# Patient Record
Sex: FEMALE | Race: WHITE | NOT HISPANIC OR LATINO | Employment: UNEMPLOYED | URBAN - NONMETROPOLITAN AREA
[De-identification: names, ages, dates, MRNs, and addresses within clinical notes are randomized per-mention and may not be internally consistent; named-entity substitution may affect disease eponyms.]

---

## 2017-02-03 ENCOUNTER — OFFICE VISIT (OUTPATIENT)
Dept: ORTHOPEDIC SURGERY | Facility: CLINIC | Age: 52
End: 2017-02-03

## 2017-02-03 VITALS — BODY MASS INDEX: 34.55 KG/M2 | HEIGHT: 66 IN | WEIGHT: 215 LBS

## 2017-02-03 DIAGNOSIS — M51.36 DDD (DEGENERATIVE DISC DISEASE), LUMBAR: Primary | ICD-10-CM

## 2017-02-03 DIAGNOSIS — M54.6 CHRONIC THORACIC BACK PAIN, UNSPECIFIED BACK PAIN LATERALITY: ICD-10-CM

## 2017-02-03 DIAGNOSIS — G89.29 CHRONIC THORACIC BACK PAIN, UNSPECIFIED BACK PAIN LATERALITY: ICD-10-CM

## 2017-02-03 PROCEDURE — 99203 OFFICE O/P NEW LOW 30 MIN: CPT | Performed by: ORTHOPAEDIC SURGERY

## 2017-02-03 RX ORDER — NAPROXEN 500 MG/1
TABLET ORAL
COMMUNITY
Start: 2017-01-09

## 2017-02-03 RX ORDER — ERGOCALCIFEROL 1.25 MG/1
CAPSULE ORAL
COMMUNITY
Start: 2017-01-09

## 2017-02-03 RX ORDER — BUPROPION HYDROCHLORIDE 150 MG/1
TABLET, EXTENDED RELEASE ORAL
COMMUNITY
Start: 2016-12-30

## 2017-02-03 RX ORDER — SIMVASTATIN 20 MG
TABLET ORAL
COMMUNITY
Start: 2017-01-09

## 2017-02-03 RX ORDER — CARISOPRODOL 350 MG/1
TABLET ORAL
COMMUNITY
Start: 2017-01-09

## 2017-02-03 RX ORDER — HYDROCODONE BITARTRATE AND ACETAMINOPHEN 7.5; 325 MG/1; MG/1
TABLET ORAL
COMMUNITY
Start: 2016-12-06

## 2017-02-03 NOTE — PROGRESS NOTES
Clarice Joy is a 51 y.o. female   Primary provider:  No Known Provider       Chief Complaint   Patient presents with   • Thoracic Spine - Establish Care     Xray at [facility] imaging       HISTORY OF PRESENT ILLNESS:    Back Pain   This is a chronic problem. The pain is present in the thoracic spine. The pain is at a severity of 5/10. The pain is mild. The pain is the same all the time. Exacerbated by: lifting, pushing or pulling. Stiffness is present all day. Associated symptoms include numbness and tingling. Pertinent negatives include no abdominal pain, chest pain, dysuria, fever, headaches or weakness.      described worsening back pain.  Pain does not radiate.  The pain is aching and worse with activity.    CONCURRENT MEDICAL HISTORY:    Past Medical History   Diagnosis Date   • Arthritis    • Asthma        No Known Allergies      Current Outpatient Prescriptions:   •  buPROPion SR (WELLBUTRIN SR) 150 MG 12 hr tablet, , Disp: , Rfl:   •  carisoprodol (SOMA) 350 MG tablet, , Disp: , Rfl:   •  HYDROcodone-acetaminophen (NORCO) 7.5-325 MG per tablet, , Disp: , Rfl:   •  naproxen (NAPROSYN) 500 MG tablet, , Disp: , Rfl:   •  simvastatin (ZOCOR) 20 MG tablet, , Disp: , Rfl:   •  vitamin D (ERGOCALCIFEROL) 98514 UNITS capsule capsule, , Disp: , Rfl:     Past Surgical History   Procedure Laterality Date   • Knee surgery Left 2013     donor acl dr Damon       Family History   Problem Relation Age of Onset   • Asthma Mother    • COPD Mother    • Hypertension Father         Social History     Social History   • Marital status: Single     Spouse name: N/A   • Number of children: N/A   • Years of education: N/A     Occupational History   • Not on file.     Social History Main Topics   • Smoking status: Current Every Day Smoker     Packs/day: 1.00     Types: Cigarettes   • Smokeless tobacco: Not on file   • Alcohol use No   • Drug use: No   • Sexual activity: Not on file     Other Topics Concern   • Not on file  "    Social History Narrative   • No narrative on file        Review of Systems   Constitutional: Positive for fatigue and unexpected weight change. Negative for appetite change, chills, diaphoresis and fever.   HENT: Negative.  Negative for congestion, dental problem, facial swelling, hearing loss, nosebleeds, postnasal drip, trouble swallowing and voice change.    Eyes: Positive for photophobia and visual disturbance. Negative for pain, discharge, redness and itching.   Respiratory: Positive for shortness of breath. Negative for cough, choking, chest tightness, wheezing and stridor.    Cardiovascular: Negative.  Negative for chest pain, palpitations and leg swelling.   Gastrointestinal: Negative.  Negative for abdominal pain, blood in stool, constipation, diarrhea and nausea.   Endocrine: Negative.  Negative for cold intolerance and heat intolerance.   Genitourinary: Negative.  Negative for dysuria, frequency, hematuria and urgency.   Musculoskeletal: Positive for arthralgias, back pain and neck pain. Negative for gait problem, joint swelling and neck stiffness.   Skin: Negative.  Negative for pallor and rash.   Allergic/Immunologic: Negative.    Neurological: Positive for tingling and numbness. Negative for syncope, facial asymmetry, speech difficulty, weakness and headaches.   Hematological: Negative.    Psychiatric/Behavioral: Positive for sleep disturbance. Negative for agitation, behavioral problems, confusion, decreased concentration, dysphoric mood and hallucinations. The patient is not nervous/anxious and is not hyperactive.    All other systems reviewed and are negative.      PHYSICAL EXAMINATION:       Visit Vitals   • Ht 66\" (167.6 cm)   • Wt 215 lb (97.5 kg)   • BMI 34.7 kg/m2       Physical Exam   Constitutional: She is oriented to person, place, and time. She appears well-developed and well-nourished. No distress.   Obese, disheveled   HENT:   Head: Normocephalic.   Right Ear: External ear normal. "   Left Ear: External ear normal.   Mouth/Throat: No oropharyngeal exudate.   Eyes: EOM are normal. Pupils are equal, round, and reactive to light. Right eye exhibits no discharge. Left eye exhibits no discharge. No scleral icterus.   Neck: Normal range of motion. No JVD present. No tracheal deviation present. No thyromegaly present.   Cardiovascular: Normal rate, regular rhythm, normal heart sounds and intact distal pulses.  Exam reveals no gallop and no friction rub.    No murmur heard.  Pulmonary/Chest: Effort normal and breath sounds normal. No respiratory distress. She has no wheezes. She has no rales. She exhibits no tenderness.   Abdominal: Soft. Bowel sounds are normal. She exhibits no distension and no mass. There is no tenderness. There is no guarding.   Musculoskeletal: Normal range of motion. She exhibits no edema or deformity.        Thoracic back: She exhibits normal range of motion, no tenderness, no bony tenderness, no swelling, no edema, no deformity, no laceration, no pain, no spasm and normal pulse.        Lumbar back: Normal. She exhibits normal range of motion, no tenderness, no bony tenderness, no swelling, no edema, no deformity, no laceration, no pain, no spasm and normal pulse.   Lymphadenopathy:     She has no cervical adenopathy.   Neurological: She is alert and oriented to person, place, and time. She has normal reflexes. She displays normal reflexes. No cranial nerve deficit. She exhibits normal muscle tone. Coordination normal.   Skin: Skin is warm and dry. No rash noted. She is not diaphoretic. No erythema.   Psychiatric: She has a normal mood and affect. Her behavior is normal. Thought content normal.       GAIT:     []  Normal  []  Antalgic    Assistive device: []  None  []  Walker     []  Crutches  []  Cane     []  Wheelchair  []  Stretcher    Right Ankle Exam     Muscle Strength   Dorsiflexion:  5/5  Plantar flexion:  5/5  Anterior tibial:  5/5  Posterior tibial:   5/5  Gastrocsoleus:  5/5  Peroneal muscle:  5/5      Left Ankle Exam     Muscle Strength   Dorsiflexion:  5/5   Plantar flexion:  5/5   Anterior tibial:  5/5   Posterior tibial:  5/5  Gastrocsoleus:  5/5  Peroneal muscle:  5/5      Right Hip Exam     Range of Motion   Internal Rotation: normal   External Rotation: normal     Muscle Strength   Abduction: 5/5   Flexion: 5/5       Left Hip Exam     Range of Motion   Internal Rotation: normal   External Rotation: normal     Muscle Strength   Abduction: 5/5   Flexion: 5/5       Back Exam     Tenderness   The patient is experiencing tenderness in the lumbar.    Range of Motion   Extension: 30   Flexion:  90 normal   Lateral Bend Right: 20   Lateral Bend Left: 20   Rotation Right: 30   Rotation Left: 30     Muscle Strength   Right Quadriceps:  5/5   Left Quadriceps:  5/5   Right Hamstrings:  5/5   Left Hamstrings:  5/5     Tests   Straight leg raise right: negative  Straight leg raise left: negative    Reflexes   Patellar: 2/4  Achilles: 2/4  Biceps: 2/4  Babinski's sign: normal     Other   Sensation: normal  Gait: normal   Erythema: no back redness  Scars: absent              Xray L spine 12/27/16 @ [facility]  Impression:  Mild degenerative change.         ASSESSMENT:    Diagnoses and all orders for this visit:    DDD (degenerative disc disease), lumbar  -     MRI Lumbar Spine Without Contrast; Future    Chronic thoracic back pain, unspecified back pain laterality  -     MRI Lumbar Spine Without Contrast; Future    Other orders  -     HYDROcodone-acetaminophen (NORCO) 7.5-325 MG per tablet;   -     vitamin D (ERGOCALCIFEROL) 73323 UNITS capsule capsule;   -     simvastatin (ZOCOR) 20 MG tablet;   -     naproxen (NAPROSYN) 500 MG tablet;   -     carisoprodol (SOMA) 350 MG tablet;   -     buPROPion SR (WELLBUTRIN SR) 150 MG 12 hr tablet;           PLAN    Recommend MRI of lumbar spine to assess for lumbar decompensation.    Yves Craig,  MD

## 2018-10-30 ENCOUNTER — TRANSCRIBE ORDERS (OUTPATIENT)
Dept: PHYSICAL THERAPY | Facility: HOSPITAL | Age: 53
End: 2018-10-30

## 2018-10-30 DIAGNOSIS — M79.7 FIBROMYALGIA: Primary | ICD-10-CM

## 2018-11-13 ENCOUNTER — APPOINTMENT (OUTPATIENT)
Dept: PHYSICAL THERAPY | Facility: HOSPITAL | Age: 53
End: 2018-11-13

## 2018-11-14 ENCOUNTER — HOSPITAL ENCOUNTER (OUTPATIENT)
Dept: PHYSICAL THERAPY | Facility: HOSPITAL | Age: 53
Setting detail: THERAPIES SERIES
Discharge: HOME | End: 2018-11-14

## 2018-11-14 DIAGNOSIS — M79.7 FIBROMYALGIA: Primary | ICD-10-CM

## 2018-11-14 PROCEDURE — 97162 PT EVAL MOD COMPLEX 30 MIN: CPT | Performed by: PHYSICAL THERAPIST

## 2018-11-14 NOTE — THERAPY EVALUATION
Outpatient Physical Therapy Ortho Initial Evaluation  [facility]     Patient Name: Clarice Joy  : 1965  MRN: [id number]  Today's Date: 2018      Visit Date: 2018  Visit   Return to MD: RUDY  Re-cert date: 18  Patient Active Problem List   Diagnosis   • Asthma   • Arthritis        Past Medical History:   Diagnosis Date   • Arthritis    • Asthma    • COPD (chronic obstructive pulmonary disease) (CMS/[facility])    • Elevated cholesterol    • Fibromyalgia         Past Surgical History:   Procedure Laterality Date   • KNEE ACL RECONSTRUCTION Left    • KNEE SURGERY Left     donor acl dr Damon       Visit Dx:     ICD-10-CM ICD-9-CM   1. Fibromyalgia M79.7 729.1     Medications (Admitted on 2018)     buPROPion SR (WELLBUTRIN SR) 150 MG 12 hr tablet     carisoprodol (SOMA) 350 MG tablet     HYDROcodone-acetaminophen (NORCO) 7.5-325 MG per tablet     naproxen (NAPROSYN) 500 MG tablet     simvastatin (ZOCOR) 20 MG tablet     vitamin D (ERGOCALCIFEROL) 30635 UNITS capsule capsule      Allergies: NKA      PT Ortho     Row Name 18 1000       Subjective Comments    Subjective Comments  54 yo female with chronic neck pain for the past 5 yrs, LBP for the past 8 months and has had ongoing right ankle pain s/p R ankle fx 1 yr ago. Diagnosed with fibromyalgia by Dr. Ahn. MD said she would benefit from aquatic therapy at this time.   -BS       Precautions and Contraindications    Precautions  fibromyalgia, h/o R ACL reconstruction 5 years ago  -BS       Subjective Pain    Able to rate subjective pain?  yes  -BS    Pre-Treatment Pain Level  7  -BS    Post-Treatment Pain Level  8  -BS    Subjective Pain Comment  neck and low back pain-sharp  -BS       General ROM    GENERAL ROM COMMENTS  B shoulder AROM WFL exept min limit ~130° B shoulder abduction; cervical spine: flex WNL ext 75% min limit rotation R 75% L 50% lateral flex B 50% retraction 50% mod limit; lumbar spine: WFL  (all planes) LE's: R hip flex 92° L hip flex 93° R knee 0-127?8 L knee 0-122° R ankle DF 4° PF 38° inv 32° whitney 15°    -BS       MMT (Manual Muscle Testing)    General MMT Comments  B C4-T1 myotome strength 5/5; LE myotomes: R LE 5/5 except 4+/5 R quads and R ankle inversion; L LE-5/5  -BS       Sensation    Light Touch  Partial deficits in the LLE;Partial deficits in the LUE L C8 (5th digit) numbness, L L2-3 paresthesia, L foot numb  -BS      User Key  (r) = Recorded By, (t) = Taken By, (c) = Cosigned By    Initials Name Provider Type    Edward Marquez, PT Physical Therapist                      Therapy Education  Given: Posture/body mechanics  Program: New  How Provided: Verbal  Provided to: Patient  Level of Understanding: Verbalized     PT OP Goals     Row Name 11/14/18 1015 11/14/18 1000       PT Short Term Goals    STG Date to Achieve  11/28/18  -BS  --    STG 1  Pt indep with HEP for aquatic therapy  -BS  --    STG 1 Progress  New  -BS  --    STG 2  Able to tolerate 25 minutes of continuous aquatic therapy with minimal fatigue noted  -BS  --    STG 2 Progress  New  -BS  --    STG 3  Reduce neck and low back pain by 25% performing ADL's  -BS  --    STG 3 Progress  New  -BS  --    STG 4  Improve cervical spine AROM to minimal limit 75%  -BS  --    STG 4 Progress  New  -BS  --       Long Term Goals    LTG Date to Achieve  12/12/18  -BS  --    LTG 1  Able to tolerate 35 minutes of continuous aquatic therapy with minimal fatigue noted  -BS  --    LTG 1 Progress  New  -BS  --    LTG 2  Reduce neck and low back pain by 50% performing ADL's  -BS  --    LTG 2 Progress  New  -BS  --    LTG 3  Improve cervical spine AROM to %  -BS  --    LTG 3 Progress  New  -BS  --    LTG 4  Reduce NDI score to 25 or less  -BS  --    LTG 4 Progress  New  -BS  --    LTG 5  Reduce Modified Oswestry score to 27 or less  -BS  --    LTG 5 Progress  New  -BS  --       Time Calculation    PT Goal Re-Cert Due Date  --  -BS  12/05/18   -BS      User Key  (r) = Recorded By, (t) = Taken By, (c) = Cosigned By    Initials Name Provider Type    BS Edward Ruggiero, PT Physical Therapist          PT Assessment/Plan     Row Name 11/14/18 1100          PT Assessment    Functional Limitations  Impaired gait;Performance in work activities;Performance in sport activities;Performance in self-care ADL;Performance in leisure activities;Limitation in home management  -BS     Impairments  Impaired flexibility;Sensation;Range of motion;Pain;Posture;Muscle strength  -BS     Assessment Comments  Chronic neck pain and severe LBP with h/o fibromyalgia. Pt would benefit from aquatic therapy to improved cervical spine mobility and assist with pain management of c-spine and l-spine.  -BS     Please refer to paper survey for additional self-reported information  Yes  -BS     Rehab Potential  Fair  -BS     Patient/caregiver participated in establishment of treatment plan and goals  Yes  -BS     Patient would benefit from skilled therapy intervention  Yes  -BS        PT Plan    PT Frequency  2x/week pool only  -BS     Predicted Duration of Therapy Intervention (Therapy Eval)  3-4 weeks  -BS     Planned CPT's?  PT EVAL MOD COMPLELITY: 37447;PT RE-EVAL: 07266;PT THER PROC EA 15 MIN: 21126;PT THER ACT EA 15 MIN: 03049;PT AQUATIC THERAPY EA 15 MIN: 30005;PT ELECTRICAL STIM UNATTEND: ;PT HOT/COLD PACK WC NONMCARE: 75192;PT NEUROMUSC RE-EDUCATION EA 15 MIN: 78008;PT THER SUPP EA 15 MIN  -BS     Physical Therapy Interventions (Optional Details)  aquatics exercise;balance training;gait training;home exercise program;lumbar stabilization;manual therapy techniques;modalities;neuromuscular re-education;patient/family education;postural re-education;ROM (Range of Motion);stretching;stair training;strengthening;transfer training  -BS     PT Plan Comments  f/u with aquatic therapy to address neck and low back pain. Emphasize trunk mobility.  -BS       User Key  (r) = Recorded By, (t)  = Taken By, (c) = Cosigned By    Initials Name Provider Type    Edward Marquez PT Physical Therapist            Exercises     Row Name 11/14/18 1000             Subjective Comments    Subjective Comments  54 yo female with chronic neck pain for the past 5 yrs, LBP for the past 8 months and has had ongoing right ankle pain s/p R ankle fx 1 yr ago. Diagnosed with fibromyalgia by Dr. Ahn. MD said she would benefit from aquatic therapy at this time.   -BS         Subjective Pain    Able to rate subjective pain?  yes  -BS      Pre-Treatment Pain Level  7  -BS      Post-Treatment Pain Level  8  -BS      Subjective Pain Comment  neck and low back pain-sharp  -BS        User Key  (r) = Recorded By, (t) = Taken By, (c) = Cosigned By    Initials Name Provider Type    Edward Marquez, PT Physical Therapist                        Outcome Measure Options: Hong Beebe, Neck Disability Index (NDI)  Modified Oswestry  Modified Oswestry Score/Comments: 32/50-66%  Neck Disability Index  Section 1 - Pain Intensity: The pain is moderate at the moment.  Section 2 - Personal Care: I need some help but manage most of my personal care.  Section 3 - Lifting: I can lift only very light weights.  Section 4 - Work: I can't do my usual work.  Section 5 - Headaches: I have moderate headaches that come frequently  Section 6 - Concentration: I have a lot of difficulty concentrating.  Section 7 - Sleeping: My sleep is mildly disturbed for up to 1-2 hours.  Section 8 - Driving: I can't drive as long as I want because of moderate neck pain.  Section 9 - Reading: I can't read as much as I want because of severe neck pain.  Section 10 - Recreation: I can hardly do recreational activities due to neck pain.  Neck Disability Index Score: 31      Time Calculation:     Therapy Suggested Charges     Code   Minutes Charges    None             Start Time: 1018  Stop Time: 1056  Time Calculation (min): 38 min  Total Timed Code Minutes- PT: 38  minute(s)     Therapy Charges for Today     Code Description Service Date Service Provider Modifiers Qty    58821144578 HC PT EVAL MOD COMPLEXITY 3 11/14/2018 Edward Ruggiero, PT GP 1          PT G-Sahil  Outcome Measure Options: Hong Beebe Neck Disability Index (NDI)  Modified Oswestry Score/Comments: 32/50-66%  Neck Disability Index Score: 31         Edward Ruggiero, PT  11/14/2018

## 2018-11-21 ENCOUNTER — HOSPITAL ENCOUNTER (OUTPATIENT)
Dept: PHYSICAL THERAPY | Facility: HOSPITAL | Age: 53
Setting detail: THERAPIES SERIES
Discharge: HOME | End: 2018-11-21

## 2018-11-21 DIAGNOSIS — M79.7 FIBROMYALGIA: Primary | ICD-10-CM

## 2018-11-21 PROCEDURE — 97110 THERAPEUTIC EXERCISES: CPT | Performed by: SPECIALIST/TECHNOLOGIST

## 2018-11-21 NOTE — THERAPY TREATMENT NOTE
"    Outpatient Physical Therapy Ortho Treatment Note  [facility]     Patient Name: Clarice Joy  : 1965  MRN: [id number]  Today's Date: 2018      Visit Date: 2018   Patients reports pain as:  5/10   Patient reports overall % improvement as:  0%    Patients attendance has been:  2/2   Insurance visits available  pre cert at 20th visits   Recert Date is:  18   Next MD visit is:  TBD       Visit Dx:    ICD-10-CM ICD-9-CM   1. Fibromyalgia M79.7 729.1       Patient Active Problem List   Diagnosis   • Asthma   • Arthritis        Past Medical History:   Diagnosis Date   • Arthritis    • Asthma    • COPD (chronic obstructive pulmonary disease) (CMS/[facility])    • Elevated cholesterol    • Fibromyalgia         Past Surgical History:   Procedure Laterality Date   • KNEE ACL RECONSTRUCTION Left    • KNEE SURGERY Left     donor acl dr Damon                       PT Assessment/Plan     Row Name 18 1000          PT Assessment    Assessment Comments  juliana rx. able to complete all. reports > pain w/ rx, but able to complete all without objective difficulty.  reports neck pain w/ HA  (Pended)   -ML        PT Plan    PT Frequency  2x/week  (Pended)   -ML     PT Plan Comments  check response. advance slowly.  build volume gradual.    (Pended)   -ML       User Key  (r) = Recorded By, (t) = Taken By, (c) = Cosigned By    Initials Name Provider Type    Jacques Musa, ATC               Exercises     Row Name 18 1000             Subjective Pain    Able to rate subjective pain?  yes  (Pended)   -ML      Pre-Treatment Pain Level  5  (Pended)   -ML      Post-Treatment Pain Level  6  (Pended)   -ML      Subjective Pain Comment  \"all over\"  (Pended)   -ML         Aquatics    Aquatics performed?  Yes  (Pended)   -ML      25408 - Aquatic Therapy Minutes  42  (Pended)   -ML         Exercise 1    Exercise Name 1  AQ: walk F/R/L  (Pended)   -ML      Time 1  5 min ea.  " (Pended)   -ML         Exercise 2    Exercise Name 2  AQ: SLR's 3 way -both  (Pended)   -ML      Reps 2  10  (Pended)   -ML         Exercise 3    Exercise Name 3  AQ: mini squats  (Pended)   -ML      Sets 3  2  (Pended)   -ML      Reps 3  10  (Pended)   -ML         Exercise 4    Exercise Name 4  AQ: Cookie push pull w/ MS  (Pended)   -ML      Sets 4  2  (Pended)   -ML      Reps 4  10  (Pended)   -ML         Exercise 5    Exercise Name 5  AQ: surface wipes  (Pended)   -ML      Reps 5  10  (Pended)   -ML         Exercise 6    Exercise Name 6  AQ: DW bike  (Pended)   -ML      Time 6  5 min  (Pended)   -ML         Exercise 7    Exercise Name 7  AQ: DW Hang  (Pended)   -ML      Time 7  5 min   (Pended)   -ML        User Key  (r) = Recorded By, (t) = Taken By, (c) = Cosigned By    Initials Name Provider Type    Jacques Musa, ATC                          PT OP Goals     Row Name 11/21/18 0900          PT Short Term Goals    STG Date to Achieve  11/28/18  (Pended)   -ML     STG 1  Pt indep with HEP for aquatic therapy  (Pended)   -ML     STG 1 Progress  Ongoing  (Pended)   -ML     STG 2  Able to tolerate 25 minutes of continuous aquatic therapy with minimal fatigue noted  (Pended)   -ML     STG 2 Progress  Ongoing  (Pended)   -ML     STG 3  Reduce neck and low back pain by 25% performing ADL's  (Pended)   -ML     STG 3 Progress  Ongoing  (Pended)   -ML     STG 4  Improve cervical spine AROM to minimal limit 75%  (Pended)   -ML     STG 4 Progress  Ongoing  (Pended)   -ML        Long Term Goals    LTG Date to Achieve  12/12/18  (Pended)   -ML     LTG 1  Able to tolerate 35 minutes of continuous aquatic therapy with minimal fatigue noted  (Pended)   -ML     LTG 1 Progress  New  (Pended)   -ML     LTG 2  Reduce neck and low back pain by 50% performing ADL's  (Pended)   -ML     LTG 2 Progress  New  (Pended)   -ML     LTG 3  Improve cervical spine AROM to %  (Pended)   -ML     LTG 3 Progress  New   (Pended)   -ML     LTG 4  Reduce NDI score to 25 or less  (Pended)   -ML     LTG 4 Progress  New  (Pended)   -ML     LTG 5  Reduce Modified Oswestry score to 27 or less  (Pended)   -ML     LTG 5 Progress  New  (Pended)   -ML       User Key  (r) = Recorded By, (t) = Taken By, (c) = Cosigned By    Initials Name Provider Type    Jacques Musa, ATC                          Time Calculation:   Start Time: (P) 0936  Stop Time: (P) 1018  Time Calculation (min): (P) 42 min  Therapy Suggested Charges     Code   Minutes Charges    63582 (CPT®) Hc Pt Neuromusc Re Education Ea 15 Min      31548 (CPT®) Hc Pt Ther Proc Ea 15 Min      12680 (CPT®) Hc Gait Training Ea 15 Min      06235 (CPT®) Hc Pt Therapeutic Act Ea 15 Min      40249 (CPT®) Hc Pt Manual Therapy Ea 15 Min      47242 (CPT®) Hc Pt Ther Massage- Per 15 Min      31579 (CPT®) Hc Pt Iontophoresis Ea 15 Min      15552 (CPT®) Hc Pt Elec Stim Ea-Per 15 Min      56205 (CPT®) Hc Pt Ultrasound Ea 15 Min      69624 (CPT®) Hc Pt Self Care/Mgmt/Train Ea 15 Min      90886 (CPT®) Hc Pt Prosthetic (S) Train Initial Encounter, Each 15 Min      49675 (CPT®) Hc Orthotic(S) Mgmt/Train Initial Encounter, Each 15min      84414 (CPT®) Hc Pt Aquatic Therapy Ea 15 Min 42 3    83417 (CPT®) Hc Pt Orthotic(S)/Prosthetic(S) Encounter, Each 15 Min       (CPT®) Hc Pt Electrical Stim Unattended      Total  42 3        Therapy Charges for Today     Code Description Service Date Service Provider Modifiers Qty    54922866006 HC PT THER PROC EA 15 MIN 11/21/2018 Jacques Pandya, ATC  3    82125745757 HC PT THER SUPP EA 15 MIN 11/21/2018 Jacques Pandya, ATC  1                    Jacques Pandya, KIYA  11/21/2018

## 2018-11-28 ENCOUNTER — HOSPITAL ENCOUNTER (OUTPATIENT)
Dept: PHYSICAL THERAPY | Facility: HOSPITAL | Age: 53
Setting detail: THERAPIES SERIES
Discharge: HOME | End: 2018-11-28

## 2018-11-29 ENCOUNTER — APPOINTMENT (OUTPATIENT)
Dept: PHYSICAL THERAPY | Facility: HOSPITAL | Age: 53
End: 2018-11-29

## 2018-12-04 ENCOUNTER — HOSPITAL ENCOUNTER (OUTPATIENT)
Dept: PHYSICAL THERAPY | Facility: HOSPITAL | Age: 53
Setting detail: THERAPIES SERIES
Discharge: HOME | End: 2018-12-04

## 2018-12-04 DIAGNOSIS — M79.7 FIBROMYALGIA: Primary | ICD-10-CM

## 2018-12-04 PROCEDURE — 97110 THERAPEUTIC EXERCISES: CPT

## 2018-12-04 NOTE — THERAPY TREATMENT NOTE
"    Outpatient Physical Therapy Ortho Treatment Note  [facility]     Patient Name: Clarice Joy  : 1965  MRN: [id number]  Today's Date: 2018      Visit Date: 2018  Pt reports 5/10 pain pre treatment,4 /10 pain post treatment  Reports \"I can tell the pool therapy has helped\"% of improvement.  Attended 3/4 visits.  Insurance available:precert  visit   Next MD appt: TBD .  Recertification: 2018.  Visit Dx:    ICD-10-CM ICD-9-CM   1. Fibromyalgia M79.7 729.1       Patient Active Problem List   Diagnosis   • Asthma   • Arthritis        Past Medical History:   Diagnosis Date   • Arthritis    • Asthma    • COPD (chronic obstructive pulmonary disease) (CMS/[facility])    • Elevated cholesterol    • Fibromyalgia         Past Surgical History:   Procedure Laterality Date   • KNEE ACL RECONSTRUCTION Left    • KNEE SURGERY Left     donor acl dr Damon       PT Ortho     Row Name 18 1100       Subjective Comments    Subjective Comments  Pt stated that she thinks the pool therapy is helping  -TL       Precautions and Contraindications    Precautions  fibromyalgia,h/oACL reconstration  -TL       Subjective Pain    Able to rate subjective pain?  yes  -TL    Pre-Treatment Pain Level  5  -TL    Post-Treatment Pain Level  4  -TL      User Key  (r) = Recorded By, (t) = Taken By, (c) = Cosigned By    Initials Name Provider Type    Delmis Sultana, PTA Physical Therapy Assistant                      PT Assessment/Plan     Row Name 18 1300          PT Assessment    Assessment Comments  pt working toward goals. No new goals met at this time. pt tolerance to aquatic therapy is good. pt tolerated new wand ex this date. Pt struggled with cookie push down and required back support against wall. No new goals met.  -TL        PT Plan    PT Frequency  2x/week  -TL     PT Plan Comments  increase reps and add UE without paddles to start off.  -TL       User Key  (r) = Recorded By, " (t) = Taken By, (c) = Cosigned By    Initials Name Provider Type    Delmis Sultana PTA Physical Therapy Assistant              Exercises     Row Name 12/04/18 1300 12/04/18 1100          Subjective Comments    Subjective Comments  --  Pt stated that she thinks the pool therapy is helping  -TL        Subjective Pain    Able to rate subjective pain?  --  yes  -TL     Pre-Treatment Pain Level  --  5  -TL     Post-Treatment Pain Level  --  4  -TL        Exercise 1    Exercise Name 1  AQ: walk F/R/L  -TL  --     Time 1  5 mins each  -TL  --        Exercise 2    Exercise Name 2  AQ: SLR's 3 way -both  -TL  --     Reps 2  15  -TL  --     Additional Comments  both   -TL  --        Exercise 3    Exercise Name 3  AQ: mini squats  -TL  --     Sets 3  2  -TL  --     Reps 3  10  -TL  --        Exercise 4    Exercise Name 4  AQ: Cookie push pull w/ MS  -TL  --     Sets 4  2  -TL  --     Reps 4  10  -TL  --        Exercise 5    Exercise Name 5  aqua: cookie push down  -TL  --     Reps 5  10  -TL  --        Exercise 6    Exercise Name 6  AQ: DW bike  -TL  --     Time 6  5 mins  -TL  --        Exercise 7    Exercise Name 7  AQ: DW Hang  -TL  --     Time 7  5 mins  -TL  --       User Key  (r) = Recorded By, (t) = Taken By, (c) = Cosigned By    Initials Name Provider Type    Delmis Sultana PTA Physical Therapy Assistant                         PT OP Goals     Row Name 12/04/18 1300          PT Short Term Goals    STG Date to Achieve  11/28/18  -TL     STG 1  Pt indep with [facility] for aquatic therapy  -TL     STG 1 Progress  Ongoing;Progressing  -TL     STG 2  Able to tolerate 25 minutes of continuous aquatic therapy with minimal fatigue noted  -TL     STG 2 Progress  Ongoing;Progressing  -TL     STG 3  Reduce neck and low back pain by 25% performing ADL's  -TL     STG 3 Progress  Ongoing  -TL     STG 4  Improve cervical spine AROM to minimal limit 75%  -TL     STG 4 Progress  Ongoing  -TL        Long Term Goals    LTG Date to  Achieve  12/12/18  -TL     LTG 1  Able to tolerate 35 minutes of continuous aquatic therapy with minimal fatigue noted  -TL     LTG 1 Progress  New  -TL     LTG 2  Reduce neck and low back pain by 50% performing ADL's  -TL     LTG 2 Progress  New  -TL     LTG 3  Improve cervical spine AROM to %  -TL     LTG 3 Progress  New  -TL     LTG 4  Reduce NDI score to 25 or less  -TL     LTG 4 Progress  New  -TL     LTG 5  Reduce Modified Oswestry score to 27 or less  -TL     LTG 5 Progress  New  -TL        Time Calculation    PT Goal Re-Cert Due Date  12/05/18  -TL       User Key  (r) = Recorded By, (t) = Taken By, (c) = Cosigned By    Initials Name Provider Type    Delmis Sultana PTA Physical Therapy Assistant          Therapy Education  Given: Symptoms/condition management, Pain management, Posture/body mechanics  Program: Reinforced  How Provided: Verbal, Demonstration  Provided to: Patient  Level of Understanding: Verbalized, Demonstrated              Time Calculation:   Start Time: 1105  Stop Time: 1150  Time Calculation (min): 45 min  Total Timed Code Minutes- PT: 45 minute(s)  Therapy Suggested Charges     Code   Minutes Charges    None           Therapy Charges for Today     Code Description Service Date Service Provider Modifiers Qty    77217589344 HC PT THER PROC EA 15 MIN 12/4/2018 Delmis Hernandez PTA GP 3                    Delmis Hernandez PTA  12/4/2018

## 2018-12-06 ENCOUNTER — HOSPITAL ENCOUNTER (OUTPATIENT)
Dept: PHYSICAL THERAPY | Facility: HOSPITAL | Age: 53
Setting detail: THERAPIES SERIES
Discharge: HOME | End: 2018-12-06

## 2018-12-06 DIAGNOSIS — M79.7 FIBROMYALGIA: Primary | ICD-10-CM

## 2018-12-06 PROCEDURE — 97110 THERAPEUTIC EXERCISES: CPT

## 2018-12-06 NOTE — THERAPY TREATMENT NOTE
"    Outpatient Physical Therapy Ortho Treatment Note  [facility]     Patient Name: Clarice Joy  : 1965  MRN: [id number]  Today's Date: 2018      Visit Date: 2018  Pt reports 6/10 pain pre treatment, 5/10 pain post treatment  Reports \"some\"% of improvement.  Attended 4/5 visits.  Insurance available: precert after 20th visit  Next MD appt:  TBD .  Recertification: 2018.  Visit Dx:    ICD-10-CM ICD-9-CM   1. Fibromyalgia M79.7 729.1       Patient Active Problem List   Diagnosis   • Asthma   • Arthritis        Past Medical History:   Diagnosis Date   • Arthritis    • Asthma    • COPD (chronic obstructive pulmonary disease) (CMS/[facility])    • Elevated cholesterol    • Fibromyalgia         Past Surgical History:   Procedure Laterality Date   • KNEE ACL RECONSTRUCTION Left    • KNEE SURGERY Left     donor acl dr Damon       PT Ortho     Row Name 18 1200       Subjective Comments    Subjective Comments  pt stated that she was hurting more today.pt stated that she woke up that way.  -TL       Precautions and Contraindications    Precautions  fibromyalgia,h/oACL reconstration  -TL       Subjective Pain    Able to rate subjective pain?  yes  -TL    Pre-Treatment Pain Level  6  -TL    Post-Treatment Pain Level  5  -TL    Row Name 18 1100       Subjective Comments    Subjective Comments  Pt stated that she thinks the pool therapy is helping  -TL       Precautions and Contraindications    Precautions  fibromyalgia,h/oACL reconstration  -TL       Subjective Pain    Able to rate subjective pain?  yes  -TL    Pre-Treatment Pain Level  5  -TL    Post-Treatment Pain Level  4  -TL      User Key  (r) = Recorded By, (t) = Taken By, (c) = Cosigned By    Initials Name Provider Type    Delmis Sultana, PTA Physical Therapy Assistant                      PT Assessment/Plan     Row Name 18 1200          PT Assessment    Assessment Comments  pt working toward goals. " Focused more on legs today secondary to pain in upper back and c-spine. Pt tolerated 45 mins of treatment. Pt tolerated 25mins of continous movement. pt tolerated scissors well this date.  -TL        PT Plan    PT Frequency  2x/week pool only  -TL     PT Plan Comments  add flutters next visit to deep end  -TL       User Key  (r) = Recorded By, (t) = Taken By, (c) = Cosigned By    Initials Name Provider Type    TL Delmis Hernandez PTA Physical Therapy Assistant              Exercises     Row Name 12/06/18 1200             Subjective Comments    Subjective Comments  pt stated that she was hurting more today.pt stated that she woke up that way.  -TL         Subjective Pain    Able to rate subjective pain?  yes  -TL      Pre-Treatment Pain Level  6  -TL      Post-Treatment Pain Level  5  -TL         Exercise 1    Exercise Name 1  AQ: walk F/R/L  -TL      Time 1  5 mins each  -TL         Exercise 2    Exercise Name 2  AQ: SLR's 3 way -both  -TL      Reps 2  20  -TL      Additional Comments  both  -TL         Exercise 3    Exercise Name 3  AQ: mini squats  -TL      Reps 3  20  -TL         Exercise 4    Exercise Name 4  aqua: ham curls  -TL      Reps 4  15  -TL         Exercise 5    Exercise Name 5  aqua: bike  -TL      Time 5  5 mins  -TL         Exercise 6    Exercise Name 6  aqua scissors  -TL      Time 6  3 mins  -TL         Exercise 7    Exercise Name 7  AQ: DW Hang  -TL      Time 7  5 mins  -TL        User Key  (r) = Recorded By, (t) = Taken By, (c) = Cosigned By    Initials Name Provider Type    Delmis Sultana PTA Physical Therapy Assistant                         PT OP Goals     Row Name 12/06/18 1200          PT Short Term Goals    STG Date to Achieve  11/28/18  -TL     STG 1  Pt indep with [facility] for aquatic therapy  -TL     STG 1 Progress  Ongoing;Progressing  -TL     STG 2  Able to tolerate 25 minutes of continuous aquatic therapy with minimal fatigue noted  -TL     STG 2 Progress  Ongoing;Progressing  -TL      STG 3  Reduce neck and low back pain by 25% performing ADL's  -TL     STG 3 Progress  Ongoing  -TL     STG 4  Improve cervical spine AROM to minimal limit 75%  -TL     STG 4 Progress  Ongoing  -TL        Long Term Goals    LTG Date to Achieve  12/12/18  -TL     LTG 1  Able to tolerate 35 minutes of continuous aquatic therapy with minimal fatigue noted  -TL     LTG 1 Progress  New  -TL     LTG 2  Reduce neck and low back pain by 50% performing ADL's  -TL     LTG 2 Progress  New  -TL     LTG 3  Improve cervical spine AROM to %  -TL     LTG 3 Progress  New  -TL     LTG 4  Reduce NDI score to 25 or less  -TL     LTG 4 Progress  New  -TL     LTG 5  Reduce Modified Oswestry score to 27 or less  -TL     LTG 5 Progress  New  -TL       User Key  (r) = Recorded By, (t) = Taken By, (c) = Cosigned By    Initials Name Provider Type    TL Delmis Hernandez PTA Physical Therapy Assistant          Therapy Education  Given: Symptoms/condition management, Pain management, Posture/body mechanics  Program: Reinforced  How Provided: Verbal, Demonstration  Provided to: Patient  Level of Understanding: Verbalized, Demonstrated              Time Calculation:   Start Time: 1015  Stop Time: 1100  Time Calculation (min): 45 min  Total Timed Code Minutes- PT: 45 minute(s)  Therapy Suggested Charges     Code   Minutes Charges    None           Therapy Charges for Today     Code Description Service Date Service Provider Modifiers Qty    85855483559 HC PT THER PROC EA 15 MIN 12/6/2018 Delmis Hernandez PTA GP 3                    Delmis Hernandez PTA  12/6/2018

## 2018-12-11 ENCOUNTER — HOSPITAL ENCOUNTER (OUTPATIENT)
Dept: PHYSICAL THERAPY | Facility: HOSPITAL | Age: 53
Setting detail: THERAPIES SERIES
Discharge: HOME | End: 2018-12-11

## 2018-12-11 DIAGNOSIS — M79.7 FIBROMYALGIA: Primary | ICD-10-CM

## 2018-12-11 PROCEDURE — 97110 THERAPEUTIC EXERCISES: CPT | Performed by: PHYSICAL THERAPIST

## 2018-12-11 NOTE — THERAPY DISCHARGE NOTE
"     Outpatient Physical Therapy Ortho Discharge  [facility]     Patient Name: Clarice Joy  : 1965  MRN: [id number]  Today's Date: 2018      Visit Date: 2018  Pt reports 7/10 pain pre treatment, 5/10 pain post treatment  Reports \"some\"% of improvement.  Attended 5/6 visits.  Insurance available: precert after 20th visit  Next MD appt:  TBD .  Recertification: N/A.      Patient Active Problem List   Diagnosis   • Asthma   • Arthritis        Past Medical History:   Diagnosis Date   • Arthritis    • Asthma    • COPD (chronic obstructive pulmonary disease) (CMS/[facility])    • Elevated cholesterol    • Fibromyalgia         Past Surgical History:   Procedure Laterality Date   • KNEE ACL RECONSTRUCTION Left    • KNEE SURGERY Left     donor acl dr Damon         Visit Dx:     ICD-10-CM ICD-9-CM   1. Fibromyalgia M79.7 729.1           PT Ortho     Row Name 18 1100       Subjective Comments    Subjective Comments  pt reports her right ankle has had the most benefit from aqua therapy thus far, minimal change to her neck and low back pain with pool therapy. Pt interested in using the pool ongoing post discharge.   -BS       Precautions and Contraindications    Precautions  fibromyalgia,h/o ACL reconstration  -BS       Subjective Pain    Able to rate subjective pain?  yes  -BS    Pre-Treatment Pain Level  7  -BS    Post-Treatment Pain Level  5  -BS      User Key  (r) = Recorded By, (t) = Taken By, (c) = Cosigned By    Initials Name Provider Type    BS Edward Ruggiero, PT Physical Therapist                       Therapy Education  Given: Symptoms/condition management, Pain management, Posture/body mechanics  Program: Reinforced  How Provided: Verbal, Demonstration  Provided to: Patient  Level of Understanding: Verbalized, Demonstrated    PT OP Goals     Row Name 18 1100          PT Short Term Goals    STG Date to Achieve  18  -BS     STG 1  Pt indep with [facility] for " aquatic therapy  -BS     STG 1 Progress  Met  -BS     STG 2  Able to tolerate 25 minutes of continuous aquatic therapy with minimal fatigue noted  -BS     STG 2 Progress  Not Met  -BS     STG 3  Reduce neck and low back pain by 25% performing ADL's  -BS     STG 3 Progress  Not Met  -BS     STG 4  Improve cervical spine AROM to minimal limit 75%  -BS     STG 4 Progress  Not Met  -BS        Long Term Goals    LTG Date to Achieve  12/12/18  -BS     LTG 1  Able to tolerate 35 minutes of continuous aquatic therapy with minimal fatigue noted  -BS     LTG 1 Progress  Not Met  -BS     LTG 2  Reduce neck and low back pain by 50% performing ADL's  -BS     LTG 2 Progress  Not Met  -BS     LTG 3  Improve cervical spine AROM to %  -BS     LTG 3 Progress  Not Met  -BS     LTG 4  Reduce NDI score to 25 or less  -BS     LTG 4 Progress  Not Met  -BS     LTG 5  Reduce Modified Oswestry score to 27 or less  -BS     LTG 5 Progress  Not Met  -BS        Time Calculation    PT Goal Re-Cert Due Date  12/05/18  -BS       User Key  (r) = Recorded By, (t) = Taken By, (c) = Cosigned By    Initials Name Provider Type    Edward Marquez, PT Physical Therapist          PT Assessment/Plan     Row Name 12/11/18 1100          PT Assessment    Assessment Comments  pt met a functional plateau, most goals unmet. Pt issued forms for free 30 day fitness membership.   -BS     Please refer to paper survey for additional self-reported information  Yes  -BS        PT Plan    PT Plan Comments  d/c'd per pt request and met a functional plateau.   -BS       User Key  (r) = Recorded By, (t) = Taken By, (c) = Cosigned By    Initials Name Provider Type    Edward Marquez, PT Physical Therapist          Exercises     Row Name 12/11/18 1100             Subjective Comments    Subjective Comments  pt reports her right ankle has had the most benefit from aqua therapy thus far, minimal change to her neck and low back pain with pool therapy. Pt interested in  using the pool ongoing post discharge.   -BS         Subjective Pain    Able to rate subjective pain?  yes  -BS      Pre-Treatment Pain Level  7  -BS      Post-Treatment Pain Level  5  -BS         Exercise 1    Exercise Name 1  AQ: walk F/R/L  -BS      Time 1  5 mins each  -BS         Exercise 2    Exercise Name 2  AQ: SLR's 3 way -both  -BS      Reps 2  20  -BS      Additional Comments  both  -BS         Exercise 3    Exercise Name 3  AQ: mini squats  -BS      Reps 3  20  -BS         Exercise 4    Exercise Name 4  aqua: ham curls  -BS      Reps 4  15  -BS         Exercise 5    Exercise Name 5  aqua: bike  -BS      Time 5  5 mins  -BS         Exercise 6    Exercise Name 6  aqua scissors  -BS      Time 6  3 mins  -BS         Exercise 7    Exercise Name 7  AQ: DW Hang  -BS      Time 7  5 mins  -BS        User Key  (r) = Recorded By, (t) = Taken By, (c) = Cosigned By    Initials Name Provider Type    Edward Marquez, PT Physical Therapist                       Outcome Measure Options: [place], Neck Disability Index (NDI)  Modified Oswestry  Modified Oswestry Score/Comments: 34-68%  Neck Disability Index  Section 1 - Pain Intensity: The pain is fairly severe at the moment.  Section 2 - Personal Care: It is painful to look after myself, and I am slow and careful.  Section 3 - Lifting: I can lift only very light weights.  Section 4 - Work: I can't do my usual work.  Section 5 - Headaches: I have severe headaches that come frequently.  Section 6 - Concentration: I have a lot of difficulty concentrating.  Section 7 - Sleeping: My sleep is greatly disturbed for up to 3-5 hours.  Section 8 - Driving: I can hardly drive at all because of severe neck pain.  Section 9 - Reading: I can't read as much as I want because of severe neck pain.  Section 10 - Recreation: I can't do any recreational activities due to neck pain.  Neck Disability Index Score: 36      Time Calculation:   Start Time: 1101  Stop Time: 1146  Time  Calculation (min): 45 min  Total Timed Code Minutes- PT: 45 minute(s)  Therapy Suggested Charges     Code   Minutes Charges    None           Therapy Charges for Today     Code Description Service Date Service Provider Modifiers Qty    35268779200 HC PT THER PROC EA 15 MIN 12/11/2018 Edward Ruggiero, PT GP 3          PT G-Codes  Outcome Measure Options: Modifed [place], Neck Disability Index (NDI)  Modified Oswestry Score/Comments: 34-68%  Neck Disability Index Score: 36     OP PT Discharge Summary  Date of Discharge: 12/11/18  Reason for Discharge: Lack of progress  Outcomes Achieved: Unable to make functional progress toward goals at this time  Discharge Destination: Home without follow-up  Discharge Instructions/Additional Comments: issued free [employer] membership forms        Edward Ruggiero, PT  12/11/2018

## 2018-12-14 ENCOUNTER — APPOINTMENT (OUTPATIENT)
Dept: PHYSICAL THERAPY | Facility: HOSPITAL | Age: 53
End: 2018-12-14